# Patient Record
Sex: FEMALE | Race: OTHER | Employment: FULL TIME | URBAN - NONMETROPOLITAN AREA
[De-identification: names, ages, dates, MRNs, and addresses within clinical notes are randomized per-mention and may not be internally consistent; named-entity substitution may affect disease eponyms.]

---

## 2018-05-24 ENCOUNTER — HOSPITAL ENCOUNTER (OUTPATIENT)
Age: 23
Discharge: HOME | End: 2018-05-24
Payer: COMMERCIAL

## 2018-05-24 ENCOUNTER — HOSPITAL ENCOUNTER (OUTPATIENT)
Dept: GENERAL RADIOLOGY | Age: 23
Discharge: HOME | End: 2018-05-24
Payer: COMMERCIAL

## 2018-05-24 DIAGNOSIS — Z02.1 PRE-EMPLOYMENT EXAMINATION: ICD-10-CM

## 2018-05-24 PROCEDURE — 71046 X-RAY EXAM CHEST 2 VIEWS: CPT

## 2020-05-19 ENCOUNTER — OFFICE VISIT (OUTPATIENT)
Dept: INTERNAL MEDICINE CLINIC | Age: 25
End: 2020-05-19
Payer: MEDICARE

## 2020-05-19 VITALS
DIASTOLIC BLOOD PRESSURE: 64 MMHG | SYSTOLIC BLOOD PRESSURE: 103 MMHG | WEIGHT: 151.5 LBS | BODY MASS INDEX: 25.24 KG/M2 | TEMPERATURE: 98.8 F | HEIGHT: 65 IN | HEART RATE: 66 BPM

## 2020-05-19 PROCEDURE — 1036F TOBACCO NON-USER: CPT | Performed by: INTERNAL MEDICINE

## 2020-05-19 PROCEDURE — G8427 DOCREV CUR MEDS BY ELIG CLIN: HCPCS | Performed by: INTERNAL MEDICINE

## 2020-05-19 PROCEDURE — G8419 CALC BMI OUT NRM PARAM NOF/U: HCPCS | Performed by: INTERNAL MEDICINE

## 2020-05-19 PROCEDURE — 99203 OFFICE O/P NEW LOW 30 MIN: CPT | Performed by: INTERNAL MEDICINE

## 2020-05-19 RX ORDER — SPIRONOLACTONE 50 MG/1
50 TABLET, FILM COATED ORAL NIGHTLY
COMMUNITY
End: 2021-11-10 | Stop reason: ALTCHOICE

## 2020-05-19 RX ORDER — DAPSONE 75 MG/G
GEL TOPICAL
COMMUNITY
End: 2021-10-13 | Stop reason: ALTCHOICE

## 2020-05-19 SDOH — HEALTH STABILITY: MENTAL HEALTH: HOW OFTEN DO YOU HAVE A DRINK CONTAINING ALCOHOL?: MONTHLY OR LESS

## 2020-05-19 SDOH — HEALTH STABILITY: MENTAL HEALTH: HOW MANY STANDARD DRINKS CONTAINING ALCOHOL DO YOU HAVE ON A TYPICAL DAY?: 1 OR 2

## 2020-05-19 ASSESSMENT — PATIENT HEALTH QUESTIONNAIRE - PHQ9
2. FEELING DOWN, DEPRESSED OR HOPELESS: 0
SUM OF ALL RESPONSES TO PHQ QUESTIONS 1-9: 0
SUM OF ALL RESPONSES TO PHQ QUESTIONS 1-9: 0
1. LITTLE INTEREST OR PLEASURE IN DOING THINGS: 0
SUM OF ALL RESPONSES TO PHQ9 QUESTIONS 1 & 2: 0

## 2020-05-19 NOTE — PROGRESS NOTES
1995    Chief Complaint   Patient presents with    New Patient     cyst on R wrist       Pt is a 27 y.o. female who presents for an initial evaluation to establish PCP. She has noticed right wrist with bump on it for weeks. It is painful if bumped. No redness, warmth, discharge. It appears to be cyst vs neuroma. Nothing makes it better or worse. Past Medical History:   Diagnosis Date    Acne     Asthma     childhood       Past Surgical History:   Procedure Laterality Date    ANTERIOR CRUCIATE LIGAMENT REPAIR Right 2018    meniscus too       Current Outpatient Medications   Medication Sig Dispense Refill    Levonorgestrel [facility]) IUD 19.5 mg 1 each by Intrauterine route      spironolactone (ALDACTONE) 50 MG tablet Take 50 mg by mouth nightly      Dapsone (ACZONE) 7.5 % GEL Apply topically       No current facility-administered medications for this visit. No Known Allergies    Social History     Socioeconomic History    Marital status: Single     Spouse name: Not on file    Number of children: Not on file    Years of education: Not on file    Highest education level: Not on file   Occupational History    Not on file   Social Needs    Financial resource strain: Not on file    Food insecurity     Worry: Not on file     Inability: Not on file    Transportation needs     Medical: Not on file     Non-medical: Not on file   Tobacco Use    Smoking status: Former Smoker     Types: Cigarettes     Last attempt to quit: 2019     Years since quittin.0    Smokeless tobacco: Never Used    Tobacco comment: once per month 1 year ago   Substance and Sexual Activity    Alcohol use: Not Currently     Frequency: Monthly or less     Drinks per session: 1 or 2    Drug use: Never    Sexual activity: Yes     Birth control/protection: I.U.D.    Lifestyle    Physical activity     Days per week: Not on file     Minutes per session: Not on file    Stress: Not on file   Relationships    - oral pharynx clear, mucous membranes moist  Neck - supple, no significant adenopathy  Chest - clear to auscultation, no wheezes, rales or rhonchi, symmetric air entry  Heart - normal rate, regular rhythm, normal S1, S2, no murmurs, rubs, clicks or gallops  Abdomen -soft, nontender, nondistended  Neurological - alert, oriented, cranial nerves II-XII intact, motor and sensation are grossly intact bilateral upper and lower extremities. Extremities - peripheral pulses normal, no pedal edema, no clubbing or cyanosis, cyst on right wrist, non-tender, no redness, warmth, or discharge  Skin - warm and dry    Diagnostic data:   None. Assessment and Plan:      Diagnosis Orders   1. Synovial cyst of right wrist  Natalya Miranda MD, Orthopedic [facility]     Cyst right wrist - refer to [facility]. Acne - follows with [facility] CNP.  - she has appt set up with GYN for yearly exam.  Will order basic labs, refused Tdap today but she will think about it. Allergies: Patient has no known allergies. Follow up in a year with wellness visit. Susana Armenta MD    [address] [facility] INTERNAL MEDICINE  [address]  Dept: [phone]  Dept Fax: [phone] : [phone]

## 2020-07-08 ENCOUNTER — TELEPHONE (OUTPATIENT)
Dept: INTERNAL MEDICINE CLINIC | Age: 25
End: 2020-07-08

## 2020-11-30 ENCOUNTER — TELEPHONE (OUTPATIENT)
Dept: INTERNAL MEDICINE CLINIC | Age: 25
End: 2020-11-30

## 2020-12-22 ENCOUNTER — HOSPITAL ENCOUNTER (OUTPATIENT)
Age: 25
Discharge: HOME | End: 2020-12-22
Payer: COMMERCIAL

## 2020-12-22 PROCEDURE — 36415 COLL VENOUS BLD VENIPUNCTURE: CPT

## 2020-12-22 PROCEDURE — 86769 SARS-COV-2 COVID-19 ANTIBODY: CPT

## 2020-12-23 LAB — SARS-COV-2 ANTIBODY, TOTAL: POSITIVE

## 2021-03-24 ENCOUNTER — IMMUNIZATION (OUTPATIENT)
Dept: PRIMARY CARE CLINIC | Age: 26
End: 2021-03-24
Payer: COMMERCIAL

## 2021-03-24 PROCEDURE — 0001A COVID-19, PFIZER VACCINE 30MCG/0.3ML DOSE: CPT | Performed by: PHARMACIST

## 2021-03-24 PROCEDURE — 91300 COVID-19, PFIZER VACCINE 30MCG/0.3ML DOSE: CPT | Performed by: PHARMACIST

## 2021-04-19 ENCOUNTER — TELEPHONE (OUTPATIENT)
Dept: PRIMARY CARE CLINIC | Age: 26
End: 2021-04-19

## 2021-04-19 ENCOUNTER — IMMUNIZATION (OUTPATIENT)
Dept: PRIMARY CARE CLINIC | Age: 26
End: 2021-04-19
Payer: COMMERCIAL

## 2021-04-19 PROCEDURE — 91300 COVID-19, PFIZER VACCINE 30MCG/0.3ML DOSE: CPT | Performed by: FAMILY MEDICINE

## 2021-04-19 PROCEDURE — 0002A COVID-19, PFIZER VACCINE 30MCG/0.3ML DOSE: CPT | Performed by: FAMILY MEDICINE

## 2021-04-19 NOTE — TELEPHONE ENCOUNTER
4/19/21 LM on VM to  [phone] or walk in 4/22/21 8-5 for 2nd dose Pfizer vaccine.  Ana Mcpherson RN    ----- Message from Elsi Rapp sent at 4/19/2021  9:12 AM EDT -----  Regarding: reschedule 2nd dose  Walcott Saucedo    Self    [phone] or work [phone]     [facility]    Had appt mixed up and missed it on 4-14

## 2021-10-04 ENCOUNTER — TELEPHONE (OUTPATIENT)
Dept: INTERNAL MEDICINE CLINIC | Age: 26
End: 2021-10-04

## 2021-10-04 NOTE — TELEPHONE ENCOUNTER
----- Message from Sharad Cowden sent at 10/1/2021  3:37 PM EDT -----  Subject: Referral Request    QUESTIONS   Reason for referral request? Loss of voice/throat issues   Has the physician seen you for this condition before? No   Preferred Specialist (if applicable)? Do you already have an appointment scheduled? No  Additional Information for Provider? Ear nose and throat specialist   requested  ---------------------------------------------------------------------------  --------------  CALL BACK INFO  What is the best way for the office to contact you? OK to leave message on   voicemail  Preferred Call Back Phone Number?  [phone]

## 2021-10-11 ENCOUNTER — TELEPHONE (OUTPATIENT)
Dept: INTERNAL MEDICINE CLINIC | Age: 26
End: 2021-10-11

## 2021-10-11 NOTE — TELEPHONE ENCOUNTER
Left message for patient that she need to schedule an appointment with Dr. Ocampo Fuelling to discuss the referral .

## 2021-10-11 NOTE — TELEPHONE ENCOUNTER
----- Message from Sabine Gustafson sent at 10/8/2021  5:12 PM EDT -----  Subject: Referral Request    QUESTIONS   Reason for referral request? pt called for a referral for an ENT   Has the physician seen you for this condition before? No   Preferred Specialist (if applicable)? Do you already have an appointment scheduled? No  Additional Information for Provider?   ---------------------------------------------------------------------------  --------------  CALL BACK INFO  What is the best way for the office to contact you? OK to leave message on   voicemail  Preferred Call Back Phone Number?  [phone]

## 2021-10-13 ENCOUNTER — OFFICE VISIT (OUTPATIENT)
Dept: INTERNAL MEDICINE CLINIC | Age: 26
End: 2021-10-13
Payer: COMMERCIAL

## 2021-10-13 VITALS
HEIGHT: 65 IN | WEIGHT: 167.4 LBS | TEMPERATURE: 98.1 F | BODY MASS INDEX: 27.89 KG/M2 | DIASTOLIC BLOOD PRESSURE: 64 MMHG | SYSTOLIC BLOOD PRESSURE: 120 MMHG | HEART RATE: 84 BPM

## 2021-10-13 DIAGNOSIS — R49.0 HOARSENESS OF VOICE: Primary | ICD-10-CM

## 2021-10-13 DIAGNOSIS — M54.50 CHRONIC BILATERAL LOW BACK PAIN WITHOUT SCIATICA: ICD-10-CM

## 2021-10-13 DIAGNOSIS — Z23 NEED FOR INFLUENZA VACCINATION: ICD-10-CM

## 2021-10-13 DIAGNOSIS — G89.29 CHRONIC BILATERAL LOW BACK PAIN WITHOUT SCIATICA: ICD-10-CM

## 2021-10-13 DIAGNOSIS — R51.9 NONINTRACTABLE HEADACHE, UNSPECIFIED CHRONICITY PATTERN, UNSPECIFIED HEADACHE TYPE: ICD-10-CM

## 2021-10-13 DIAGNOSIS — Z00.00 WELLNESS EXAMINATION: ICD-10-CM

## 2021-10-13 PROCEDURE — 90471 IMMUNIZATION ADMIN: CPT | Performed by: INTERNAL MEDICINE

## 2021-10-13 PROCEDURE — G8482 FLU IMMUNIZE ORDER/ADMIN: HCPCS | Performed by: STUDENT IN AN ORGANIZED HEALTH CARE EDUCATION/TRAINING PROGRAM

## 2021-10-13 PROCEDURE — 1036F TOBACCO NON-USER: CPT | Performed by: STUDENT IN AN ORGANIZED HEALTH CARE EDUCATION/TRAINING PROGRAM

## 2021-10-13 PROCEDURE — 99213 OFFICE O/P EST LOW 20 MIN: CPT | Performed by: STUDENT IN AN ORGANIZED HEALTH CARE EDUCATION/TRAINING PROGRAM

## 2021-10-13 PROCEDURE — 90674 CCIIV4 VAC NO PRSV 0.5 ML IM: CPT | Performed by: INTERNAL MEDICINE

## 2021-10-13 PROCEDURE — G8419 CALC BMI OUT NRM PARAM NOF/U: HCPCS | Performed by: STUDENT IN AN ORGANIZED HEALTH CARE EDUCATION/TRAINING PROGRAM

## 2021-10-13 PROCEDURE — G8427 DOCREV CUR MEDS BY ELIG CLIN: HCPCS | Performed by: STUDENT IN AN ORGANIZED HEALTH CARE EDUCATION/TRAINING PROGRAM

## 2021-10-13 SDOH — ECONOMIC STABILITY: FOOD INSECURITY: WITHIN THE PAST 12 MONTHS, THE FOOD YOU BOUGHT JUST DIDN'T LAST AND YOU DIDN'T HAVE MONEY TO GET MORE.: NEVER TRUE

## 2021-10-13 SDOH — ECONOMIC STABILITY: FOOD INSECURITY: WITHIN THE PAST 12 MONTHS, YOU WORRIED THAT YOUR FOOD WOULD RUN OUT BEFORE YOU GOT MONEY TO BUY MORE.: NEVER TRUE

## 2021-10-13 ASSESSMENT — ENCOUNTER SYMPTOMS
NAUSEA: 0
TROUBLE SWALLOWING: 0
WHEEZING: 0
BACK PAIN: 1
CONSTIPATION: 0
CHOKING: 0
RHINORRHEA: 0
VOICE CHANGE: 1
VOMITING: 0
SHORTNESS OF BREATH: 0
DIARRHEA: 0
SORE THROAT: 0
EYES NEGATIVE: 1
COUGH: 0
ABDOMINAL PAIN: 0

## 2021-10-13 ASSESSMENT — PATIENT HEALTH QUESTIONNAIRE - PHQ9
SUM OF ALL RESPONSES TO PHQ QUESTIONS 1-9: 0
SUM OF ALL RESPONSES TO PHQ QUESTIONS 1-9: 0
1. LITTLE INTEREST OR PLEASURE IN DOING THINGS: 0
SUM OF ALL RESPONSES TO PHQ QUESTIONS 1-9: 0
SUM OF ALL RESPONSES TO PHQ9 QUESTIONS 1 & 2: 0
2. FEELING DOWN, DEPRESSED OR HOPELESS: 0

## 2021-10-13 ASSESSMENT — SOCIAL DETERMINANTS OF HEALTH (SDOH): HOW HARD IS IT FOR YOU TO PAY FOR THE VERY BASICS LIKE FOOD, HOUSING, MEDICAL CARE, AND HEATING?: NOT HARD AT ALL

## 2021-10-13 NOTE — PROGRESS NOTES
After obtaining consent, and per orders of Dr. Lia Alexis, injection of INFLUENZA VACCINE UNDER AGE 65 given in Left deltoid by Lakeisha Krause LPN. Patient instructed to remain in clinic for 20 minutes afterwards, and to report any adverse reaction to me immediately.

## 2021-10-13 NOTE — PROGRESS NOTES
Emilee Reilly (:  1995) is a 27 y.o. female,Established patient, here for evaluation of the following chief complaint(s): Other (discuss referral to ENT ) and Hoarse      ASSESSMENT/PLAN:  1. Hoarseness of voice  -     [facility]  2. Nonintractable headache, unspecified chronicity pattern, unspecified headache type  3. Chronic bilateral low back pain without sciatica  4. Wellness examination  -     Hepatic Function Panel; Future  -     Lipid, Fasting; Future  -     Basic Metabolic Panel; Future  -     Hepatitis C Antibody; Future  -     HIV-1 and HIV-2 Antibodies; Future    - BMP, Lipid panel, LFT, Hep C and HIV screen ordered. Patient will get these done as an OP and will follow up back in the office once complete.    - ENT referral for unprovoked hoarseness with a fhx of vocal cord cancer.   - Flu vaccine today. Return After completing labs and following with ENT. Subjective   SUBJECTIVE/OBJECTIVE:  HPI     Emilee Reilly is a 27 y.o. female with a PMHx of childhood asthma that has resolved and acne for which she no longer uses any topical treatment who reports to the clinic today with a 3 month hx of intermittent hoarseness after following a 2 week trip to [place] ([facility]). She denies any positive contact with anyone who was sick. Exacerbated by alcohol night before and no relieving factors identified. No sore throat or cough associated with it. No pain. She states that it comes 1-2x per week and may last for a couple days at a time. She works as an  at [employer] and has to use a loud voice routinely, however she has been working there for 2 years without this issue before. Also states she has had an intermittent headache that is one sided but is self limited. She states she has a positive family history for vocal cord cancer which is why she wanted to get it looked at. She denies any dysphagia, dysarthria, neck mass or pain. She has tried cigarettes in the past but was never a smoker. Drinks socially. No other acute complaints at this time    During chart review it was noted that patient established care in 5/2020 but has not received routine labwork. During our discussion today patient was amenable to getting routine labwork to include, BMP, LFT, lipid panel as well as Hep C and HIV screen and also a flu shot. States she follows up with OB for IUD and is planning to get a pap for screening. Review of Systems   Constitutional: Negative for activity change, appetite change, chills, fatigue and fever. HENT: Positive for voice change. Negative for congestion, ear pain, hearing loss, postnasal drip, rhinorrhea, sore throat and trouble swallowing. Eyes: Negative. Respiratory: Negative for cough, choking, shortness of breath and wheezing. Cardiovascular: Negative for chest pain and palpitations. Gastrointestinal: Negative for abdominal pain, constipation, diarrhea, nausea and vomiting. Endocrine: Negative. Genitourinary: Negative for dysuria and hematuria. Musculoskeletal: Positive for back pain (Low back - Chronic). Skin: Negative. Neurological: Positive for headaches. Negative for dizziness, weakness and numbness. Hematological: Negative. Objective      Physical Exam  Constitutional:       General: She is not in acute distress. Appearance: Normal appearance. She is normal weight. She is not ill-appearing. HENT:      Head: Normocephalic and atraumatic. Right Ear: Tympanic membrane, ear canal and external ear normal. There is no impacted cerumen. Left Ear: Tympanic membrane, ear canal and external ear normal. There is no impacted cerumen. Nose: Nose normal. No congestion or rhinorrhea. Mouth/Throat:      Lips: Pink. Mouth: Mucous membranes are moist. No injury, oral lesions or angioedema. Dentition: Normal dentition. Palate: No mass and lesions. Pharynx: Oropharynx is clear. No oropharyngeal exudate or posterior oropharyngeal erythema. Tonsils: No tonsillar exudate. Eyes:      Extraocular Movements: Extraocular movements intact. Conjunctiva/sclera: Conjunctivae normal.      Pupils: Pupils are equal, round, and reactive to light. Neck:      Thyroid: No thyromegaly or thyroid tenderness. Trachea: Trachea normal.   Cardiovascular:      Rate and Rhythm: Normal rate and regular rhythm. Pulses: Normal pulses. Heart sounds: Normal heart sounds. No murmur heard. No friction rub. No gallop. Pulmonary:      Effort: Pulmonary effort is normal.      Breath sounds: No wheezing, rhonchi or rales. Abdominal:      General: Bowel sounds are normal. There is no distension. Palpations: Abdomen is soft. Tenderness: There is no abdominal tenderness. Musculoskeletal:         General: Normal range of motion. Cervical back: Normal range of motion and neck supple. No tenderness. Lymphadenopathy:      Cervical: No cervical adenopathy. Skin:     General: Skin is warm and dry. Capillary Refill: Capillary refill takes less than 2 seconds. Neurological:      General: No focal deficit present. Mental Status: She is alert and oriented to person, place, and time. Cranial Nerves: No cranial nerve deficit. Motor: No weakness. Psychiatric:         Mood and Affect: Mood normal.         Behavior: Behavior normal.         Thought Content: Thought content normal.         Judgment: Judgment normal.          On this date 10/13/2021 I have spent 30 minutes reviewing previous notes, test results and face to face with the patient discussing the diagnosis and importance of compliance with the treatment plan as well as documenting on the day of the visit. Case Discussed with Dr. Javy Centeno    An electronic signature was used to authenticate this note.     --Alexa Andrews DO

## 2021-11-10 ENCOUNTER — OFFICE VISIT (OUTPATIENT)
Dept: ENT CLINIC | Age: 26
End: 2021-11-10
Payer: COMMERCIAL

## 2021-11-10 VITALS
RESPIRATION RATE: 14 BRPM | SYSTOLIC BLOOD PRESSURE: 95 MMHG | OXYGEN SATURATION: 98 % | HEIGHT: 65 IN | BODY MASS INDEX: 27.49 KG/M2 | WEIGHT: 165 LBS | TEMPERATURE: 96.8 F | HEART RATE: 78 BPM | DIASTOLIC BLOOD PRESSURE: 65 MMHG

## 2021-11-10 DIAGNOSIS — J38.2 VOCAL FOLD NODULES: Primary | ICD-10-CM

## 2021-11-10 DIAGNOSIS — R49.0 DYSPHONIA: ICD-10-CM

## 2021-11-10 PROCEDURE — 99203 OFFICE O/P NEW LOW 30 MIN: CPT | Performed by: OTOLARYNGOLOGY

## 2021-11-10 PROCEDURE — 1036F TOBACCO NON-USER: CPT | Performed by: OTOLARYNGOLOGY

## 2021-11-10 PROCEDURE — 31575 DIAGNOSTIC LARYNGOSCOPY: CPT | Performed by: OTOLARYNGOLOGY

## 2021-11-10 PROCEDURE — G8482 FLU IMMUNIZE ORDER/ADMIN: HCPCS | Performed by: OTOLARYNGOLOGY

## 2021-11-10 PROCEDURE — G8419 CALC BMI OUT NRM PARAM NOF/U: HCPCS | Performed by: OTOLARYNGOLOGY

## 2021-11-10 PROCEDURE — G8427 DOCREV CUR MEDS BY ELIG CLIN: HCPCS | Performed by: OTOLARYNGOLOGY

## 2021-11-10 NOTE — PATIENT INSTRUCTIONS
Vocal Hygiene   ? If you rely on your voice for your livelihood, you are a professional voice user. If you are a professional voice user, it is of the utmost importance you take good care of your voice.     (teachers, preachers, actors, singers, broadcasters, doctors, brokers, salespeople)   ? If you rely on your voice to get you through your daily activities, taking care of your voice can be just as important.     (talking on the telephone, visiting with friends, caregiving)   ? Beginning (or refining) a \"healthy voice program\" is the first step in taking better care of your voice and is essential for vocal folds (aka, vocal cords) that have been hurt or stressed. Begin taking better care of your voice TODAY . 1. HYDRATE: People are more dehydrated than often realized. ? Hydration refers to keeping the vocal cords moist both externally and internally. - External dehydration may come from breathing dry air, breathing with an open mouth, smoking, and certain drying medications. The cords can be re-hydrated by inhaling steam (i.e. hot shower, facial steamer, hot-water vaporizer). - Internal dehydration comes from too much caffeine, alcohol, drying drugs, or sweating without fluid replacement. Internal re-hydration is probably best achieved by drinking lots of water. PUTTING THIS INTO PRACTICE:   ? Make an effort to carry water with your throughout the day. Try to sip small amount frequently rather than gulp down a large amount at once. ? The following amounts are estimates for healthy individuals without conflicting medical conditions:   - Men 3.0 liters (101 ounces) = approximately 6 -(16 oz) bottles of water / day   - Women 2.2 liters (74 ounces) = approximately 4 -(16 oz) bottles of water / day   ? Replace coffee, tea, sodas with water. ? If you don't like water, mix a small amount of juice or flavoring into your water (ex: ½ and ½). Gradually decrease the amount of juice or flavoring. 2. MANAGE YOUR MUCOUS: ? Bothersome mucous can cause people to frequently clear their throats or have the sensation that something is on their vocal cords. Your doctor may advise you to atake a medication called a \"mucolytic\" that helps to keep respirator secretions thin and flowing. The most common mucolytic is Mucinex (common name: \"guaifenesin\")       PUTTING THIS INTO PRACTICE:   ?  Stay hydrated! ?  Ask your doctor if a mucolytic medication would be helpful. ?  If mucous continues to be problematic, discuss the possibility of reflux with your doctor. Reflux of stomach acids into the throat may be responsible for sensations of throat mucous. 3. STOP THROAT CLEARING:   ? Throat clearing is extremely traumatic to your vocal cords  causing excess wear and tear. Bothersome mucous can cause people have the sensation something is on their vocal cords that they need to clear off. The irritation and swelling produced by the throat clearing can cause saliva to sit in your throat. This causes more throat clearing. More throat clearing causes more stagnant mucus which causes more throat clearing, which causes more mucus, etc A vicious cycle will ensue and the habit can be very difficult to break   PUTTING THIS INTO PRACTICE:   ? Begin by trying to suppress the throat clearing. ? When the feeling is present, try swallowing hard or sipping on water. ? In necessary, clear your throat silently -- \"huh\"  For example, when you close your vocal cords, think of picking up a ½ lb weight instead of a 100 lb weight. ? Ask your doctor if a mucolytic or reflux medication would be helpful. 4. IRRITATING YOUR VOICE:   ? \"Everything in moderation\" - this [place] advice is especially true when it comes to your voice. Compare your vocal cords to your legs. You would not expect to run a marathon (or even a half-marathon for that matter) and then later do a hour-long leg work out in [place].  Similarly, you should not talk all day at work and then head out for an evening of yelling or talking over noise. PUTTING THIS INTO PRACTICE:   ? Avoid lengthy conversations on the phone. ? Rest your voice 10 minutes for every 2 hours of talking. ? Talk at a moderate volume  to do this you frequently have to minimize background noise (i.e. television, radio, party noise, traffic, airplanes, and restaurants). ? Avoid shouting and screaming. These traumatize the vocal cords. ? Smoking is very hard on your voice causing chronic irritation and dehydration. ? Maintain good water intake and consider using a hot-water humidifier at night when traveling to dry environments (i.e. [address], etc.). Airplanes are notoriously dry environments. If traveling by plane, increase your water intake accordingly. ? Antihistamines/decongestants are commonly found in cold and allergy medications. These have a drying effect on the vocal cords which is detrimental. Common medications include: Benadryl, Zyrtec, Allegra, Claritin, Sudafed, and any other antihistamine. 5. LPR / ARIK (Laryngophrayngeal Reflux / Gastroesophageal Reflux):   ? Laryngopharyngeal reflux refers to a spill-over of acids from the esophagus (food-tube) onto the vocal cords (in your throat). The acids are produced in the stomach and move up the esophagus into the throat. LPR is different from GERD, in which stomach acids back up from the stomach into the esophagus only. Research indicates people with vocal pathology frequently have LPR. ? LPR should be managed, if NOT it could inflame the vocal cords making it more difficult to heal certain vocal cord disorders. PUTTING THIS INTO PRACTICE:   ? Diet and lifestyle changes (provided in additional handout)   ? Physician prescribed medications (proton pump inhibitors, H2 blockers)   ? Surgery is reserved for very persistent cases that do not respond to behavioral treatment and medicines.

## 2021-11-10 NOTE — PROGRESS NOTES
[facility], NOSE AND THROAT  [address] [facility]  Dept: [phone]  Dept Fax: [phone]: [phone]       Dear Karla Morrell DO, thank you for referring Willi Weinberg in consultation for a chief complaint of: Hoarseness. My full evaluation is as follows:     HPI:     As you recall, Willi Weinberg is a 27 y.o. female who presents today for evaluation of her voice. For the past 3 months, she has been very hoarse. She was [place] and completely lost her voice towards the end of that trip. Now, she is worse all the time, but it will get worse where she is totally aphonic for a day or so. She is an  [employer] and frequently has to talk over the machinery at work. She does not have consistent reflux symptoms, but she has found that drinking alcohol makes her voice worse. Her grandfather had larynx cancer. She does not smoke. History:     No Known Allergies  Current Outpatient Medications   Medication Sig Dispense Refill    Levonorgestrel [facility] [place]) IUD 19.5 mg 1 each by Intrauterine route       No current facility-administered medications for this visit.      Past Medical History:   Diagnosis Date    Acne     Dermatology - [facility] on [employer] [employer]    Asthma     childhood      Past Surgical History:   Procedure Laterality Date    ANTERIOR CRUCIATE LIGAMENT REPAIR Right 2018    meniscus too     Family History   Problem Relation Age of Onset    Mental Retardation Brother     Cancer Maternal Grandfather     Diabetes type 2  Maternal Grandmother     Diabetes type 2  Other      Social History     Tobacco Use    Smoking status: Never Smoker    Smokeless tobacco: Never Used    Tobacco comment: once per month 1 year ago   Substance Use Topics    Alcohol use: Yes     Comment: socially       All of the past medical history, past surgical history, family history,social history, allergies and current medications were reviewed with the patient. Review of Systems      A complete review of systems was obtained by the patient intake form, which is attached to this visit. Objective:   BP 95/65 (Site: Left Wrist, Position: Sitting)   Pulse 78   Temp 96.8 °F (36 °C) (Infrared)   Resp 14   Ht 5' 5\" (1.651 m)   Wt 165 lb (74.8 kg)   SpO2 98%   BMI 27.46 kg/m²     PHYSICAL EXAM  ABNORMAL OTOLARYNGOLOGIC EXAM FINDINGS: Voice: Grade 2 roughness to breathiness 1 asthenia 1 strain 1. OTHER PERTINENT EXAM FINDINGS:  GENERAL: Awake, NAD, Non-toxic appearing. NEUROLOGICAL: GCS 15, Cranial nerves II-VI, VIII-XII grossly intact,  Facial nerve (VII) w/ House-Brackman Grade 1 of 6 bilaterally, No evidence of nystagmus or gross asymmetry    EARS: Pinna well-formed,  EAC non-stenotic w/o cerumen impaction AU,  TM's intact AU w/o effusion or active infection appreciated  EYES: EOMI, No ptosis appreciated   NOSE: Dorsum w/o scar or deformity,  No mucopurulence appreciated, Patent nasal airways bilaterally. No inferior turbinate hypertrophy. No septal deviation. ORAL CAVITY: No mucosal masses/lesions appreciated, Tongue with FROM. Appropriate IRAM w/o trismus. OROPHARYNX: No mucosal masses or lesions appreciated. Symmetric palatal elevation w/o draping. NECK: Soft, supple. No crepitus or masses appreciated,  Trachea midline. No thyromegaly or thyroid tenderness. Procedure performed: Flexible Nasolaryngoscopy 70813  Attending: Donavan Tapia MD  Anesthesia: Topical lidocaine 4% with oxymetazoline  Blood loss: None  Specimens and Cultures: None  Procedure: The patient was taken to the procedure room and placed upright in the chair. Local anesthesia was administered to the bilateral nasal passageways. A flexible nasolaryngoscope was then passed through the nose and the hypopharynx and larynx were examined.   Findings:            NASAL CAVITY/NASOPHARYNX: No masses/lesions/polyposis/mucosal ulceration,  Bilateral eustachian tube orifices non-obstructed. OROPHARYNX: BOT, vallecula, posterior tonsillar pillars w/o masses/lesions/mucosal ulceration. No RP bulging. HYPOPHARYNX: No masses/lesions/mucosal ulceration of piriform sinuses or post-cricoid region. No significant pooling of secretions. GLOTTIS/SUPRAGLOTTIS: Lingual & laryngeal surface of epiglottis, AE folds, arytenoids, false vocal folds w/o masses/lesions/mucosal ulceration. She has bilateral mid fold opposing nodules. True vocal folds freely mobile with hourglass closure pattern due to the nodules. Patient tolerated the procedure well and without complications. Post procedure condition is stable. Data: The relevant prior clinic notes were reviewed today, including the referring physicians notes. Imaging: None       Assessment & Plan   Chayo Peacock is a 27 y.o. female with:   1. Vocal fold nodules    2. 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 The patient would benefit from voice therapy. I will be in contact with our speech-language pathologist to arrange the patient's voice therapy sessions. They will keep me informed of her progress, and of any additional needs. I will see her back on an as-needed basis. If she does not get any benefit from voice therapy, I may consider injecting steroids to hasten the resolution of the nodules. This is very rarely necessary. Return if symptoms worsen or fail to improve. Thank you for involving me in this patient's care. Please do not hesitate to call with any questions or concerns. Sincerely,    Karina Connors M.D. Otolaryngology-Head and Neck Surgery    **This report has been created using voice recognition software. It may contain minor errors which are inherent in voice recognition technology. **

## 2021-11-12 ENCOUNTER — HOSPITAL ENCOUNTER (OUTPATIENT)
Dept: SPEECH THERAPY | Age: 26
Setting detail: THERAPIES SERIES
Discharge: HOME | End: 2021-11-12
Payer: COMMERCIAL

## 2021-11-12 PROCEDURE — 92524 BEHAVRAL QUALIT ANALYS VOICE: CPT | Performed by: SPEECH-LANGUAGE PATHOLOGIST

## 2021-11-12 NOTE — PROGRESS NOTES
** PLEASE SIGN, DATE AND TIME CERTIFICATION BELOW AND RETURN TO [facility] OUTPATIENT REHABILITATION (FAX #: [phone]). ATTEST/CO-SIGN IF ACCESSING VIA [employer]. THANK YOU.**    I certify that I have examined the patient below and determined that Physical Medicine and Rehabilitation service is necessary and that I approve the established plan of care for up to 90 days or as specifically noted. Attestation, signature or co-signature of physician indicates approval of certification requirements.    ________________________ ____________ __________  Physician Signature   Date   Time     St Nw IN:  8620  TIME OUT:  219  UNTIMED TREATMENT:  58  TIMED TREATMENT: 0  TOTAL TIME: 58 minutes      **FOM score: VHI: 59 (lower score is better)    Date: 2021  Patient Name: Cecil Burrell       CSN: [id number]    : 1995  (27 y.o.)  Gender: female   Referring Physician:  Dr. Saira Shore  Diagnosis: Vocal Fold nodules [J38.2]  Secondary Diagnosis:  dysphonia  Insurance/Certification Information: [facility]  Visit number / total approved visits: 1 - 60 visits PT/OT/ST combined per calendar year; precert required after 60 visits  Visit count since last progress note:  1  Certification Date: 50  Diet:  Regular with thin liquids    History of Present Illness/Injury: Patient reports she started with hoarseness about 3 months ago and she would have days where she was completely aphonic. She reports this seemed to be the case the day after she would drink alcohol. She stopped drinking alcohol, but would still lose her voice periodically. Patient reports she work long days most of the time and her job is verbally demanding in a noisy environment sometimes.     Please see medical history questionnaire for information related to prior medical history, allergies and medications  Previous Tx:No  Asthma:Yes as a child  Allergies: seasonal  GERD: No     Subjective:  Patient reports she worked a shorter day today compared to normal.  Patient cooperative. Pain: 0/10    ORAL MOTOR EXAMINATION:  WFL    BREATHING:   At Rest: Diaphragmatic/Abdominal  During Speech: Diaphragmatic/Abdominal    BREATH SUPPORT FOR SPEECH: Adequate  Maximum Phonation Time: /i/: 7.8 Seconds, 1.2 - 1.8% RAP    VELOPHARYNGEAL CLOSURE/ADEQUACY: WFL    VOICE PRODUCTION DURING CONNECTED SPEECH AND VOCAL TASKS:    NASAL RESONANCE: Normal    PITCH: Normal, but on the low side of normal; patient reports her pitch is lower than her baseline  Fundamental Frequency of Phonation:       176 Hz  Musical Fundamental Frequency range:  Gliding up the scale: 157-387 Hz (230 Hz range)   Gliding down the scale: 384 - 118 Hz range (266 Hz range)    VOCAL INFLECTION:Normal    ORAL RESONANCE: Normal    INTENSITY / LOUDNESS: Variable, too soft within conversation (in a quiet setting), however, the vocal intensity of the patient's laugh is too loud      Avg dB  /a/        65 dB   Count 1-10        64 dB   [place]        67 dB   Conversation        69 dB     VOCAL QUALITY:    Breathy Mild   Dysphonic Moderate   Harsh    Hoarse Moderate   Strained/Strangled Mild   Strident          LARYNGEAL TENSION: Patient reports having tension/tightness the more she uses her voice. SUSTAINED VOICED - VOICELESS PRODUCTION:  S/Z Ratio: 3.2      Trial 1 Trial 2 Trial 3   /s/ 19.4 23.4 17.1   /z/ 5.6 7.3 5.1     RATE OF SPEECH: normal    IDENTIFIED VOCAL ABUSES / MISUSES:   *grunting - does heavy lifting at crossfit    *Caffeine - 8 ounces of coffee per day  *verbally demanding job  *alcohol - occasionally  *prolonged talking     DYSARTHRIA:No    CLINICAL IMPRESSIONS: Patient presents with moderate dysphonia characterized by a significantly hoarse and mildly breathy vocal quality with pitch breaks and periods of glottal stern.      REHAB POTENTIAL:  Very Good     EDUCATION:  Learner: Patient  Education: Reviewed results and recommendations of this evaluation and Reviewed ST goals and Plan of Care  Evaluation of Education: Verbalizes understanding, Needs further instruction and Family not present    PLAN / TREATMENT RECOMMENDATIONS: Voice Therapy 1 time per week for 8 weeks. SHORT TERM GOALS:  Short-term Goals  Timeframe for Short-term Goals: 6 weeks  Goal 1: Patient will follow through with vocal hygiene principles with no more than min cues for improved vocal fold health and allow the vocal nodules to heal.  Goal 2: Patient will utilize easy onset within reading tasks (phrases, sentences) with no more than min cues to avoid hyperadduction and allow the vocal nodules to heal.  Goal 3: Patient will demonstrate use of resonant voice within structured tasks (reading, sustained phonation, etc) with no more than min cues to decrease laryngeal tension/tightness. Goal 4: Patient will complete sustained phonation tasks with RAP less than . 8% and without pitch breaks for improved vocal quality. Goal 5: Patient will complete musical glides up and down the scale with a range of 280 Hz or greater without voice breaks for improved vocal quality. LONG TERM GOALS:  Long-term Goals  Timeframe for Long-term Goals: 8 weeks  Goal 1: Patient's score on the Voice Handicap Index (VHI) will decrease by at least 15 points suggesting improved vocal quality.       Ashleigh Jacome M.S. [place] [id number]

## 2021-11-18 ENCOUNTER — HOSPITAL ENCOUNTER (OUTPATIENT)
Dept: SPEECH THERAPY | Age: 26
Setting detail: THERAPIES SERIES
Discharge: HOME | End: 2021-11-18
Payer: COMMERCIAL

## 2021-11-18 PROCEDURE — 92507 TX SP LANG VOICE COMM INDIV: CPT

## 2021-11-23 ENCOUNTER — APPOINTMENT (OUTPATIENT)
Dept: SPEECH THERAPY | Age: 26
End: 2021-11-23
Payer: COMMERCIAL

## 2021-11-23 NOTE — DISCHARGE SUMMARY
[facility] [address] NOTE  OUTPATIENT  [facility]    Name: Monique Ovalles  YOB: 1995  Gender: female  Patient Name: Monique Ovalles        CSN: [id number]   Referring Physician:  Madeleine Richardson MD  Diagnosis:  Nodules of vocal cords J38.2  Secondary Diagnosis: Dysphonia    Patient is discharged from Speech Therapy services at this time. See last note for details related to results of therapy and goal achievement. Reason for discharge: Patient requested to be discharged. . Patient reports she \"found somewhere else she would rather go\".        [facility] M.S., CF-SLP, [id number]

## 2021-11-30 ENCOUNTER — APPOINTMENT (OUTPATIENT)
Dept: SPEECH THERAPY | Age: 26
End: 2021-11-30
Payer: COMMERCIAL

## 2022-01-25 ENCOUNTER — NURSE ONLY (OUTPATIENT)
Dept: LAB | Age: 27
End: 2022-01-25

## 2022-01-27 LAB
C. TRACHOMATIS DNA,THIN PREP: NEGATIVE
N. GONORRHOEAE DNA, THIN PREP: NEGATIVE
SOURCE: NORMAL

## 2022-01-29 LAB
APTIMA MEDIA TYPE: NORMAL
T. VAGINALIS SPECIMEN SOURCE: NORMAL
TRICHOMONAS VAGINALIS BY NAA: NEGATIVE

## 2022-02-14 LAB — CYTOLOGY THIN PREP PAP: NORMAL

## 2022-11-30 ENCOUNTER — HOSPITAL ENCOUNTER (EMERGENCY)
Age: 27
Discharge: HOME | End: 2022-11-30
Payer: COMMERCIAL

## 2022-11-30 VITALS
TEMPERATURE: 98.1 F | SYSTOLIC BLOOD PRESSURE: 110 MMHG | HEART RATE: 76 BPM | RESPIRATION RATE: 14 BRPM | HEIGHT: 65 IN | DIASTOLIC BLOOD PRESSURE: 59 MMHG | WEIGHT: 162 LBS | BODY MASS INDEX: 26.99 KG/M2 | OXYGEN SATURATION: 99 %

## 2022-11-30 DIAGNOSIS — Z11.3 SCREEN FOR STD (SEXUALLY TRANSMITTED DISEASE): Primary | ICD-10-CM

## 2022-11-30 LAB
BILIRUBIN URINE: NEGATIVE
BLOOD, URINE: NEGATIVE
CHARACTER, URINE: CLEAR
COLOR: YELLOW
GLUCOSE URINE: NEGATIVE MG/DL
KETONES, URINE: NEGATIVE
LEUKOCYTE ESTERASE, URINE: NEGATIVE
NITRITE, URINE: NEGATIVE
PH, URINE: 7 (ref 5–9)
PREGNANCY, URINE: NEGATIVE
PROTEIN, URINE: ABNORMAL MG/DL
SPECIFIC GRAVITY, URINE: 1.02 (ref 1–1.03)
TRICHOMONAS PREP: NEGATIVE
UROBILINOGEN, URINE: 0.2 EU/DL (ref 0.2–1)

## 2022-11-30 PROCEDURE — 87205 SMEAR GRAM STAIN: CPT

## 2022-11-30 PROCEDURE — 87070 CULTURE OTHR SPECIMN AEROBIC: CPT

## 2022-11-30 PROCEDURE — 87591 N.GONORRHOEAE DNA AMP PROB: CPT

## 2022-11-30 PROCEDURE — 81025 URINE PREGNANCY TEST: CPT

## 2022-11-30 PROCEDURE — 99212 OFFICE O/P EST SF 10 MIN: CPT | Performed by: EMERGENCY MEDICINE

## 2022-11-30 PROCEDURE — 87808 TRICHOMONAS ASSAY W/OPTIC: CPT

## 2022-11-30 PROCEDURE — 81003 URINALYSIS AUTO W/O SCOPE: CPT

## 2022-11-30 PROCEDURE — 99213 OFFICE O/P EST LOW 20 MIN: CPT

## 2022-11-30 PROCEDURE — 87491 CHLMYD TRACH DNA AMP PROBE: CPT

## 2022-11-30 ASSESSMENT — ENCOUNTER SYMPTOMS
SHORTNESS OF BREATH: 0
COUGH: 0
ABDOMINAL PAIN: 0

## 2022-11-30 ASSESSMENT — PAIN - FUNCTIONAL ASSESSMENT: PAIN_FUNCTIONAL_ASSESSMENT: NONE - DENIES PAIN

## 2022-11-30 NOTE — DISCHARGE INSTRUCTIONS
We will Contact you if anything is positive and treat accordingly    Go to [address] for HIV and syphilis testing

## 2022-12-01 LAB
CHLAMYDIA TRACHOMATIS BY RT-PCR: NOT DETECTED
CT/NG SOURCE: NORMAL
NEISSERIA GONORRHOEAE BY RT-PCR: NOT DETECTED

## 2022-12-01 NOTE — ED PROVIDER NOTES
[facility]  Urgent Care Encounter       CHIEF COMPLAINT       Chief Complaint   Patient presents with    Exposure to STD     \" I THINK I MAY HAVE HAD A STD EXPOSURE  nO S/S\"       Nurses Notes reviewed and I agree except as noted in the HPI. HISTORY OF PRESENT ILLNESS   Obed William is a 24 y.o. female who presents for possible STD exposure. Patient denies any symptoms. She wants tested for STDs. She states her most recent partner is also being tested for STD. She is concerned he may have symptoms. She denies any vaginal discharge or abnormal bleeding. No dysuria, frequency or urgency. Denies any genital sores or lesions. HPI    REVIEW OF SYSTEMS     Review of Systems   Constitutional:  Negative for activity change, fatigue and fever. Respiratory:  Negative for cough and shortness of breath. Gastrointestinal:  Negative for abdominal pain. Genitourinary:  Negative for dyspareunia, dysuria, genital sores, pelvic pain, vaginal bleeding, vaginal discharge and vaginal pain. PAST MEDICAL HISTORY         Diagnosis Date    Acne     Dermatology - [facility] on [address]    Asthma     childhood       SURGICALHISTORY     Patient  has a past surgical history that includes Anterior cruciate ligament repair (Right, 2018). CURRENT MEDICATIONS       Discharge Medication List as of 11/30/2022  6:22 PM        CONTINUE these medications which have NOT CHANGED    Details   Levonorgestrel [facility], [place], operated by [facility]) IUD 19.5 mg 1 each by Intrauterine route, Intrauterine, Starting Tue 2/20/2018, Historical Med             ALLERGIES     Patient is has No Known Allergies.     Patients   Immunization History   Administered Date(s) Administered    COVID-19, PFIZER PURPLE top, DILUTE for use, (age 15 y+), 30mcg/0.3mL 03/24/2021, 04/19/2021    Influenza, FLUCELVAX, (age 10 mo+), MDCK, PF, 0.5mL 10/13/2021       FAMILY HISTORY     Patient's family history includes Cancer in her maternal grandfather; Diabetes type 2  in her maternal grandmother and another family member; Mental Retardation in her brother. SOCIAL HISTORY     Patient  reports that she has been smoking cigarettes. She has never used smokeless tobacco. She reports current alcohol use. She reports that she does not use drugs. PHYSICAL EXAM     ED TRIAGE VITALS  BP: (!) 110/59, Temp: 98.1 °F (36.7 °C), Heart Rate: 76, Resp: 14, SpO2: 99 %,Estimated body mass index is 26.96 kg/m² as calculated from the following:    Height as of this encounter: 5' 5\" (1.651 m). Weight as of this encounter: 162 lb (73.5 kg). ,Patient's last menstrual period was 11/13/2022. Physical Exam  Constitutional:       General: She is not in acute distress. Appearance: She is normal weight. She is not ill-appearing. HENT:      Head: Normocephalic. Cardiovascular:      Rate and Rhythm: Normal rate and regular rhythm. Pulses: Normal pulses. Heart sounds: Normal heart sounds. Pulmonary:      Effort: Pulmonary effort is normal.      Breath sounds: Normal breath sounds. Skin:     General: Skin is warm and dry. Neurological:      General: No focal deficit present. Mental Status: She is alert.    Psychiatric:         Mood and Affect: Mood normal.       DIAGNOSTIC RESULTS     Labs:  Results for orders placed or performed during the hospital encounter of 11/30/22   Trichomonas Screen    Specimen: Vaginal   Result Value Ref Range    Trichomonas Prep NEGATIVE NEGATIVE   Urinalysis   Result Value Ref Range    Glucose, Ur Negative NEGATIVE mg/dl    Bilirubin Urine Negative NEGATIVE    Ketones, Urine Negative NEGATIVE    Specific Gravity, Urine 1.020 1.002 - 1.030    Blood, Urine Negative NEGATIVE    pH, Urine 7.00 5.0 - 9.0    Protein, Urine Trace (A) NEGATIVE mg/dl    Urobilinogen, Urine 0.20 0.2 - 1.0 eu/dl    Nitrite, Urine Negative NEGATIVE    Leukocyte Esterase, Urine Negative NEGATIVE    Color, UA Yellow STRAW-YELLOW    Character, Urine Clear CLEAR-SL CLOUD Pregnancy, Urine   Result Value Ref Range    Pregnancy, Urine NEGATIVE NEGATIVE       IMAGING:    No orders to display         EKG:      URGENT CARE COURSE:     Vitals:    11/30/22 1746   BP: (!) 110/59   Pulse: 76   Resp: 14   Temp: 98.1 °F (36.7 °C)   SpO2: 99%   Weight: 162 lb (73.5 kg)   Height: 5' 5\" (1.651 m)       Medications - No data to display         PROCEDURES:  None    FINAL IMPRESSION      1. Screen for STD (sexually transmitted disease)          DISPOSITION/ PLAN     Patient presents for screening test for STDs. STD testing in process. She will be discharged and we will contact if she is positive requires treatment. I advised her to go to the health department for HIV and syphilis testing.       PATIENT REFERRED TO:  Lopez Marie MD  [facility], [address] / [facility] [place] [address]      DISCHARGE MEDICATIONS:  Discharge Medication List as of 11/30/2022  6:22 PM          Discharge Medication List as of 11/30/2022  6:22 PM          Discharge Medication List as of 11/30/2022  6:22 PM          FRANCES Mena CNP    (Please note that portions of this note were completed with a voice recognition program. Efforts were made to edit the dictations but occasionally words are mis-transcribed.)           FRANCES Mena CNP  11/30/22 1084

## 2022-12-03 LAB
GENITAL CULTURE, ROUTINE: NORMAL
GRAM STAIN RESULT: NORMAL

## 2023-03-27 ENCOUNTER — OFFICE VISIT (OUTPATIENT)
Dept: ENT CLINIC | Age: 28
End: 2023-03-27
Payer: COMMERCIAL

## 2023-03-27 VITALS
DIASTOLIC BLOOD PRESSURE: 62 MMHG | OXYGEN SATURATION: 99 % | BODY MASS INDEX: 28.32 KG/M2 | TEMPERATURE: 97.1 F | HEIGHT: 65 IN | WEIGHT: 170 LBS | HEART RATE: 79 BPM | SYSTOLIC BLOOD PRESSURE: 100 MMHG

## 2023-03-27 DIAGNOSIS — K21.9 GASTROESOPHAGEAL REFLUX DISEASE WITHOUT ESOPHAGITIS: ICD-10-CM

## 2023-03-27 DIAGNOSIS — R49.0 HOARSENESS: Primary | ICD-10-CM

## 2023-03-27 PROCEDURE — G8484 FLU IMMUNIZE NO ADMIN: HCPCS | Performed by: NURSE PRACTITIONER

## 2023-03-27 PROCEDURE — G8427 DOCREV CUR MEDS BY ELIG CLIN: HCPCS | Performed by: NURSE PRACTITIONER

## 2023-03-27 PROCEDURE — 99213 OFFICE O/P EST LOW 20 MIN: CPT | Performed by: NURSE PRACTITIONER

## 2023-03-27 PROCEDURE — 4004F PT TOBACCO SCREEN RCVD TLK: CPT | Performed by: NURSE PRACTITIONER

## 2023-03-27 PROCEDURE — G8419 CALC BMI OUT NRM PARAM NOF/U: HCPCS | Performed by: NURSE PRACTITIONER

## 2023-03-27 RX ORDER — FAMOTIDINE 20 MG/1
20 TABLET, FILM COATED ORAL 2 TIMES DAILY
Qty: 60 TABLET | Refills: 0 | Status: SHIPPED | OUTPATIENT
Start: 2023-03-27 | End: 2023-04-26

## 2023-04-27 ENCOUNTER — NURSE ONLY (OUTPATIENT)
Dept: LAB | Age: 28
End: 2023-04-27

## 2023-04-27 LAB
HBV SURFACE AG SERPL QL IA: NEGATIVE
HCV IGG SERPL QL IA: NEGATIVE
HIV 1+2 AB+HIV1 P24 AG SERPL QL IA: NONREACTIVE
SPECIMEN SOURCE: NORMAL

## 2023-04-28 LAB — RPR SER QL: NONREACTIVE

## 2023-05-01 LAB
HSV1 DNA SPEC QL NAA+PROBE: NOT DETECTED
HSV2 DNA SPEC QL NAA+PROBE: NOT DETECTED
SPECIMEN SOURCE: NORMAL

## 2023-05-03 LAB
C. TRACHOMATIS DNA,THIN PREP: NEGATIVE
N. GONORRHOEAE DNA, THIN PREP: NEGATIVE
SOURCE: NORMAL
SPEC CONTAINER SPEC: NORMAL
SPECIMEN SOURCE: NORMAL
T VAGINALIS RRNA SPEC QL NAA+PROBE: NEGATIVE

## 2023-05-04 ENCOUNTER — TELEPHONE (OUTPATIENT)
Dept: ENT CLINIC | Age: 28
End: 2023-05-04

## 2023-05-04 NOTE — TELEPHONE ENCOUNTER
Yes, she is on on my list of patients to call yet this week. We started with low dose famotidine. If she is still having reflux, I would increase her famotidine to 40mg twice daily (double what she is now). If after a few weeks her reflux is not improved, then we would add omeprazole 20mg twice daily - this is over the counter. I would like to try to find the least amount of medication that controls her reflux. Keeping notes of what the triggers are (last meal, eating before bed, drinking anything other than water) is helpful.

## 2023-05-04 NOTE — TELEPHONE ENCOUNTER
Called patient. Informed her of all Crystal said. Patient will increase her famotidine to 40mg twice daily as Crystal advised. Patient asked if she could make an appointment to discuss any other options with Crystal such as any other tests that could be done or to be re-scoped. Appointment scheduled and placed on wait list.  Patient verbalized understanding and thanked me.

## 2023-05-04 NOTE — TELEPHONE ENCOUNTER
Patient was calling in regarding receiving a follow up phone call in about three weeks after her appointment, but she has not heard anything.     She is continuing to have problems with acid reflux and she wants to schedule another appointment     Please advise

## 2023-05-10 LAB — CYTOLOGY THIN PREP PAP: NORMAL

## 2023-06-02 ENCOUNTER — HOSPITAL ENCOUNTER (EMERGENCY)
Age: 28
Discharge: HOME | End: 2023-06-02
Payer: COMMERCIAL

## 2023-06-02 VITALS
RESPIRATION RATE: 20 BRPM | DIASTOLIC BLOOD PRESSURE: 61 MMHG | HEART RATE: 77 BPM | TEMPERATURE: 97 F | OXYGEN SATURATION: 100 % | SYSTOLIC BLOOD PRESSURE: 119 MMHG

## 2023-06-02 DIAGNOSIS — J02.9 VIRAL PHARYNGITIS: Primary | ICD-10-CM

## 2023-06-02 LAB — S PYO AG THROAT QL: NEGATIVE

## 2023-06-02 PROCEDURE — 87651 STREP A DNA AMP PROBE: CPT

## 2023-06-02 PROCEDURE — 99213 OFFICE O/P EST LOW 20 MIN: CPT

## 2023-06-02 PROCEDURE — 99213 OFFICE O/P EST LOW 20 MIN: CPT | Performed by: NURSE PRACTITIONER

## 2023-06-02 ASSESSMENT — ENCOUNTER SYMPTOMS
CONSTIPATION: 0
BLOOD IN STOOL: 0
SORE THROAT: 1
RHINORRHEA: 0
DIARRHEA: 0
SHORTNESS OF BREATH: 0
WHEEZING: 0
NAUSEA: 0
COUGH: 0
VOMITING: 0
ABDOMINAL PAIN: 0
EYE PAIN: 0

## 2023-06-02 NOTE — ED PROVIDER NOTES
been ongoing for 2 weeks. But strep is negative. Patient be discharged home with symptomatic treatment with Tylenol or ibuprofen. Patient to follow-up with primary care provider. Patient instructed to go to ER for worsening symptoms, inability to swallow, inability to keep liquids down, inability to urinate for greater than 8 hours or difficulty breathing. Follow-up with your primary care provider. Increase oral intake. Warm salt water gargles after meals and at bedtime to help with sore throat. May take tylenol or ibuprofen as needed for pain or fever. Medications - No data to display  PROCEDURES:    Procedures    FINALIMPRESSION      1.  Viral pharyngitis        DISPOSITION/PLAN   DISPOSITION Decision To Discharge 06/02/2023 05:49:28 PM    PATIENT REFERRED TO:  Eli Dash MD  [facility], [address]  [address] [address]  [phone]    In 3 days      DISCHARGE MEDICATIONS:  New Prescriptions    No medications on file     Current Discharge Medication List          FRANCES Foster - FRANCES Pompa - CNP  06/02/23 1570 FRANCES Kerr - CNP  06/02/23 1570 Uri Barroso, FRANCES - QUEENIE  06/02/23 7024

## 2023-06-05 ENCOUNTER — TELEPHONE (OUTPATIENT)
Dept: INTERNAL MEDICINE CLINIC | Age: 28
End: 2023-06-05

## 2023-06-21 ENCOUNTER — NURSE ONLY (OUTPATIENT)
Dept: LAB | Age: 28
End: 2023-06-21